# Patient Record
Sex: FEMALE | Race: BLACK OR AFRICAN AMERICAN | NOT HISPANIC OR LATINO | Employment: FULL TIME | URBAN - METROPOLITAN AREA
[De-identification: names, ages, dates, MRNs, and addresses within clinical notes are randomized per-mention and may not be internally consistent; named-entity substitution may affect disease eponyms.]

---

## 2023-01-20 PROBLEM — R21 RASH: Status: ACTIVE | Noted: 2023-01-20

## 2023-01-20 PROBLEM — E83.52 HYPERCALCEMIA: Status: ACTIVE | Noted: 2023-01-20

## 2023-01-20 PROBLEM — E55.9 VITAMIN D DEFICIENCY: Status: ACTIVE | Noted: 2023-01-20

## 2023-01-20 PROBLEM — E03.9 HYPOTHYROIDISM: Status: ACTIVE | Noted: 2023-01-20

## 2023-01-20 PROBLEM — M17.9 KNEE OSTEOARTHRITIS: Status: ACTIVE | Noted: 2023-01-20

## 2023-01-20 PROBLEM — M32.9 SYSTEMIC LUPUS ERYTHEMATOSUS (MULTI): Status: ACTIVE | Noted: 2023-01-20

## 2023-01-20 PROBLEM — I50.20 SYSTOLIC CONGESTIVE HEART FAILURE (MULTI): Status: ACTIVE | Noted: 2023-01-20

## 2023-01-20 PROBLEM — R73.03 PRE-DIABETES: Status: ACTIVE | Noted: 2023-01-20

## 2023-01-20 PROBLEM — D50.9 IRON DEFICIENCY ANEMIA: Status: ACTIVE | Noted: 2023-01-20

## 2023-01-20 PROBLEM — I10 HYPERTENSION: Status: ACTIVE | Noted: 2023-01-20

## 2023-01-20 PROBLEM — L21.9 SEBORRHEIC DERMATITIS: Status: ACTIVE | Noted: 2023-01-20

## 2023-01-20 PROBLEM — I25.10 CORONARY ARTERY DISEASE: Status: ACTIVE | Noted: 2023-01-20

## 2023-01-20 PROBLEM — E21.0 HYPERPARATHYROIDISM, PRIMARY (MULTI): Status: ACTIVE | Noted: 2023-01-20

## 2023-01-20 RX ORDER — AMLODIPINE BESYLATE 5 MG/1
1 TABLET ORAL DAILY
COMMUNITY
Start: 2016-10-12 | End: 2023-03-07 | Stop reason: SDUPTHER

## 2023-01-20 RX ORDER — LEVOTHYROXINE SODIUM 125 UG/1
TABLET ORAL DAILY
COMMUNITY
Start: 2018-12-26 | End: 2023-03-23 | Stop reason: SDUPTHER

## 2023-01-20 RX ORDER — NYSTATIN AND TRIAMCINOLONE ACETONIDE 100000; 1 [USP'U]/G; MG/G
CREAM TOPICAL
COMMUNITY
Start: 2020-11-06

## 2023-01-20 RX ORDER — METFORMIN HYDROCHLORIDE 500 MG/1
TABLET ORAL
COMMUNITY
Start: 2016-10-04

## 2023-01-20 RX ORDER — ERGOCALCIFEROL 1.25 MG/1
1 CAPSULE ORAL
COMMUNITY
Start: 2017-05-24

## 2023-01-20 RX ORDER — CHLORTHALIDONE 25 MG/1
25 TABLET ORAL DAILY
COMMUNITY
End: 2023-03-07 | Stop reason: SDUPTHER

## 2023-03-07 ENCOUNTER — OFFICE VISIT (OUTPATIENT)
Dept: PRIMARY CARE | Facility: CLINIC | Age: 63
End: 2023-03-07
Payer: COMMERCIAL

## 2023-03-07 VITALS
OXYGEN SATURATION: 97 % | HEART RATE: 77 BPM | TEMPERATURE: 97 F | BODY MASS INDEX: 37.47 KG/M2 | DIASTOLIC BLOOD PRESSURE: 73 MMHG | SYSTOLIC BLOOD PRESSURE: 130 MMHG | HEIGHT: 69 IN | WEIGHT: 253 LBS

## 2023-03-07 DIAGNOSIS — I10 PRIMARY HYPERTENSION: Primary | ICD-10-CM

## 2023-03-07 PROCEDURE — 3078F DIAST BP <80 MM HG: CPT | Performed by: STUDENT IN AN ORGANIZED HEALTH CARE EDUCATION/TRAINING PROGRAM

## 2023-03-07 PROCEDURE — 1036F TOBACCO NON-USER: CPT | Performed by: STUDENT IN AN ORGANIZED HEALTH CARE EDUCATION/TRAINING PROGRAM

## 2023-03-07 PROCEDURE — 3075F SYST BP GE 130 - 139MM HG: CPT | Performed by: STUDENT IN AN ORGANIZED HEALTH CARE EDUCATION/TRAINING PROGRAM

## 2023-03-07 PROCEDURE — 99213 OFFICE O/P EST LOW 20 MIN: CPT | Performed by: STUDENT IN AN ORGANIZED HEALTH CARE EDUCATION/TRAINING PROGRAM

## 2023-03-07 RX ORDER — AMLODIPINE BESYLATE 5 MG/1
5 TABLET ORAL DAILY
Qty: 90 TABLET | Refills: 3 | Status: SHIPPED | OUTPATIENT
Start: 2023-03-07 | End: 2023-03-23 | Stop reason: SDUPTHER

## 2023-03-07 RX ORDER — CHLORTHALIDONE 25 MG/1
25 TABLET ORAL DAILY
Qty: 90 TABLET | Refills: 3 | Status: SHIPPED | OUTPATIENT
Start: 2023-03-07 | End: 2023-03-23 | Stop reason: SDUPTHER

## 2023-03-07 ASSESSMENT — PAIN SCALES - GENERAL: PAINLEVEL: 0-NO PAIN

## 2023-03-07 NOTE — PROGRESS NOTES
"03/08/23    Subjective   Patient ID: Damari Conteh is a 62 y.o. female who presents for No chief complaint on file..    HPI    HTN  - taking amlodipine, chlorthalidone  - not missing doses  - taking BP infrequently at home, usually 130s  - denies CP, SOB, LE edema      Objective   /73   Pulse 77   Temp 36.1 °C (97 °F) (Temporal)   Ht 1.753 m (5' 9\")   Wt 115 kg (253 lb)   SpO2 97%   BMI 37.36 kg/m²     Physical Exam    General: well-appearing, NAD  HEENT: NC/AT  Cardiac: rrr, no m/r/g  Lungs: normal work of breathing, CTAB  Neuro: grossly intact  Psych: no depression, anxiety      Assessment/Plan   Problem List Items Addressed This Visit          Circulatory    Hypertension - Primary    Relevant Medications    amLODIPine (Norvasc) 5 mg tablet    chlorthalidone (Hygroton) 25 mg tablet     HTN  - chronic, stable  - continue medications, sent refills to pharmacy  - RTC in about 6 months for physical    Trung Xiong MD  "

## 2023-03-23 DIAGNOSIS — E03.9 HYPOTHYROIDISM, UNSPECIFIED TYPE: Primary | ICD-10-CM

## 2023-03-23 DIAGNOSIS — I10 PRIMARY HYPERTENSION: ICD-10-CM

## 2023-03-23 RX ORDER — AMLODIPINE BESYLATE 5 MG/1
5 TABLET ORAL DAILY
Qty: 90 TABLET | Refills: 3 | Status: SHIPPED | OUTPATIENT
Start: 2023-03-23 | End: 2023-03-23

## 2023-03-23 RX ORDER — CHLORTHALIDONE 25 MG/1
25 TABLET ORAL DAILY
Qty: 90 TABLET | Refills: 3 | Status: SHIPPED | OUTPATIENT
Start: 2023-03-23 | End: 2023-03-23

## 2023-03-23 RX ORDER — LEVOTHYROXINE SODIUM 125 UG/1
TABLET ORAL
Status: CANCELLED | OUTPATIENT
Start: 2023-03-23

## 2023-03-23 RX ORDER — LEVOTHYROXINE SODIUM 125 UG/1
125 TABLET ORAL
Qty: 90 TABLET | Refills: 3 | Status: SHIPPED | OUTPATIENT
Start: 2023-03-23 | End: 2023-03-23

## 2023-03-23 RX ORDER — CHLORTHALIDONE 25 MG/1
25 TABLET ORAL DAILY
Qty: 90 TABLET | Refills: 3 | Status: CANCELLED | OUTPATIENT
Start: 2023-03-23

## 2023-03-23 RX ORDER — AMLODIPINE BESYLATE 5 MG/1
5 TABLET ORAL DAILY
Qty: 90 TABLET | Refills: 3 | Status: CANCELLED | OUTPATIENT
Start: 2023-03-23

## 2023-03-23 NOTE — TELEPHONE ENCOUNTER
Patient called the refill line asking for refills on pended medications. She was last seen in clinic 03/07/23

## 2023-10-09 ENCOUNTER — PHARMACY VISIT (OUTPATIENT)
Dept: PHARMACY | Facility: CLINIC | Age: 63
End: 2023-10-09
Payer: COMMERCIAL

## 2023-10-09 PROCEDURE — RXMED WILLOW AMBULATORY MEDICATION CHARGE

## 2023-11-28 ENCOUNTER — OFFICE VISIT (OUTPATIENT)
Dept: PRIMARY CARE | Facility: CLINIC | Age: 63
End: 2023-11-28
Payer: COMMERCIAL

## 2023-11-28 ENCOUNTER — PHARMACY VISIT (OUTPATIENT)
Dept: PHARMACY | Facility: CLINIC | Age: 63
End: 2023-11-28
Payer: COMMERCIAL

## 2023-11-28 VITALS
DIASTOLIC BLOOD PRESSURE: 80 MMHG | OXYGEN SATURATION: 95 % | HEIGHT: 70 IN | HEART RATE: 75 BPM | SYSTOLIC BLOOD PRESSURE: 140 MMHG | TEMPERATURE: 97 F | WEIGHT: 262 LBS | BODY MASS INDEX: 37.51 KG/M2

## 2023-11-28 DIAGNOSIS — E03.9 HYPOTHYROIDISM, UNSPECIFIED TYPE: ICD-10-CM

## 2023-11-28 DIAGNOSIS — I10 PRIMARY HYPERTENSION: ICD-10-CM

## 2023-11-28 LAB
CREAT UR-MCNC: 161.7 MG/DL (ref 20–320)
MICROALBUMIN UR-MCNC: 8.4 MG/L
MICROALBUMIN/CREAT UR: 5.2 UG/MG CREAT

## 2023-11-28 PROCEDURE — 82043 UR ALBUMIN QUANTITATIVE: CPT

## 2023-11-28 PROCEDURE — 3077F SYST BP >= 140 MM HG: CPT | Performed by: STUDENT IN AN ORGANIZED HEALTH CARE EDUCATION/TRAINING PROGRAM

## 2023-11-28 PROCEDURE — 82570 ASSAY OF URINE CREATININE: CPT

## 2023-11-28 PROCEDURE — 3079F DIAST BP 80-89 MM HG: CPT | Performed by: STUDENT IN AN ORGANIZED HEALTH CARE EDUCATION/TRAINING PROGRAM

## 2023-11-28 PROCEDURE — 1036F TOBACCO NON-USER: CPT | Performed by: STUDENT IN AN ORGANIZED HEALTH CARE EDUCATION/TRAINING PROGRAM

## 2023-11-28 PROCEDURE — 99213 OFFICE O/P EST LOW 20 MIN: CPT | Performed by: STUDENT IN AN ORGANIZED HEALTH CARE EDUCATION/TRAINING PROGRAM

## 2023-11-28 RX ORDER — LEVOTHYROXINE SODIUM 125 UG/1
125 TABLET ORAL
Qty: 90 TABLET | Refills: 3 | Status: SHIPPED | OUTPATIENT
Start: 2023-11-28 | End: 2024-11-27

## 2023-11-28 RX ORDER — CHLORTHALIDONE 25 MG/1
25 TABLET ORAL
Qty: 90 TABLET | Refills: 3 | Status: SHIPPED | OUTPATIENT
Start: 2023-11-28 | End: 2024-11-27

## 2023-11-28 RX ORDER — AMLODIPINE BESYLATE 10 MG/1
10 TABLET ORAL DAILY
Qty: 90 TABLET | Refills: 3 | Status: SHIPPED | OUTPATIENT
Start: 2023-11-28 | End: 2024-11-27

## 2023-11-28 ASSESSMENT — PAIN SCALES - GENERAL: PAINLEVEL: 0-NO PAIN

## 2023-11-28 NOTE — PROGRESS NOTES
"Subjective   Patient ID: Damari Conteh is a 63 y.o. female who is presenting for a follow-up visit for management of hypertension.    HPI:    #HTN  - Previously seen 3/2023 by Dr. Xiong  - Currently taking amlodipine 5 mg, chlorthalidone 25 mg, not missing doses  - 2 week history of morning tension-type headaches, no interruptions to sleep  - Last checked BP at home 1 month ago - states home readings approx like the one today at 140s/80s  - Denies LE edema, SOB, blurry vision, CP, palpitations    Review of Systems  Constitutional:  Negative for chills and fever.   HENT:  Negative for congestion.    Eyes:  Negative for visual disturbance.   Respiratory:  Negative for cough and shortness of breath.    Cardiovascular:  Negative for chest pain.   Gastrointestinal:  Negative for abdominal pain, diarrhea and nausea.   Genitourinary: Negative.    Musculoskeletal:  Positive for chronic bilateral knee pain. Also endorses recent history of neck pain reproducible on left neck rotation.  Neurological:  Negative for dizziness and headaches.   Psychiatric/Behavioral: No changes in mood or behavior     Objective   /80 (BP Location: Right arm, BP Cuff Size: Adult long)   Pulse 75   Temp 36.1 °C (97 °F) (Temporal)   Ht 1.778 m (5' 10\")   Wt 119 kg (262 lb)   SpO2 95%   BMI 37.59 kg/m²     Physical Exam  Constitutional:       General: She is not in acute distress.  Eyes:      Conjunctiva/sclera: Conjunctivae normal.   Cardiovascular:      Rate and Rhythm: Normal rate and regular rhythm.      Heart sounds: Normal heart sounds. No murmur heard.  Pulmonary:      Effort: Pulmonary effort is normal.      Breath sounds: Normal breath sounds.   Abdominal:      General: There is no distension.      Palpations: Abdomen is soft.   Musculoskeletal:         General: Normal range of motion.      Cervical back: Mild pain of leftward neck rotation. No limited neck ROM  Neurological:      General: No focal deficit present.      Mental " Status: She is alert and oriented to person, place, and time.      Comments: CN 2-12 grossly intact   Psychiatric:         Mood and Affect: Mood normal.         Behavior: Behavior normal.     Assessment/Plan     #Primary hypertension  - Chronic, not at goal   - Patient's blood pressure remains elevated despite adherence to medication. States that  systolic measured in office today is comparable to what she has seen at home in the past. Not regularly checking blood pressure. In setting of persistently elevated BP with new-onset headaches, will increase amlodipine dosage. Additionally, patient's last RFP 1/2023 demonstrated decreased GFR (56). Plan to repeat labs with urinalysis to assess for albuminuria.  - START amlodipine 10 mg daily  - CONTINUE chlorthalidone 25 mg  - Ordered RFP, UA albumin  - Continue monitoring home BP    Jose Rashid, MS3  Medical Student    I saw and evaluated the patient. I personally obtained the key and critical portions of the history and physical exam. I reviewed and modified the student's documentation and discussed the patient with the medical student. I agree with the above documentation and medical decision making.    Follow up/Return to the clinic in 1 month for HTN    Attending Supervision: discussed with attending physician, Dr. Gustavo Sheridan MD  Family Medicine, PGY-3

## 2023-11-28 NOTE — PROGRESS NOTES
"Subjective   Patient ID: Damari Conteh is a 63 y.o. female who presents for Follow-up.    HPI    Review of Systems    Objective   /80 (BP Location: Right arm, BP Cuff Size: Adult long)   Pulse 75   Temp 36.1 °C (97 °F) (Temporal)   Ht 1.778 m (5' 10\")   Wt 119 kg (262 lb)   SpO2 95%   BMI 37.59 kg/m²      Physical Exam     Assessment/Plan   Diagnoses and all orders for this visit:  Primary hypertension  -     amLODIPine (Norvasc) 10 mg tablet; Take 1 tablet (10 mg) by mouth once daily.  -     chlorthalidone (Hygroton) 25 mg tablet; TAKE 1 TABLET (25 MG) BY MOUTH ONCE DAILY  -     Renal function panel; Future  -     Albumin, urine, random; Future  Hypothyroidism, unspecified type  -     levothyroxine (Synthroid, Levoxyl) 125 mcg tablet; TAKE 1 TABLET (125 MCG) BY MOUTH ONCE DAILY IN THE MORNING. TAKE BEFORE MEALS.  Other orders  -     Follow Up In Primary Care      Follow up/Return to the clinic in {RTC:06556}    {Attending Supervision:87599::\"discussed\":1} with attending physician,  ***    Kasey Sheridan MD  Family Medicine, PGY-3    "

## 2023-12-01 NOTE — PROGRESS NOTES
I reviewed the resident/fellow's documentation and discussed the patient with the resident/fellow. I agree with the resident/fellow's medical decision making as documented in the note.     Herbert Chen MD

## 2024-01-12 ENCOUNTER — APPOINTMENT (OUTPATIENT)
Dept: PRIMARY CARE | Facility: CLINIC | Age: 64
End: 2024-01-12
Payer: COMMERCIAL

## 2024-01-15 PROCEDURE — RXMED WILLOW AMBULATORY MEDICATION CHARGE

## 2024-01-16 ENCOUNTER — PHARMACY VISIT (OUTPATIENT)
Dept: PHARMACY | Facility: CLINIC | Age: 64
End: 2024-01-16
Payer: COMMERCIAL

## 2024-04-22 PROCEDURE — RXMED WILLOW AMBULATORY MEDICATION CHARGE

## 2024-04-23 ENCOUNTER — PHARMACY VISIT (OUTPATIENT)
Dept: PHARMACY | Facility: CLINIC | Age: 64
End: 2024-04-23
Payer: COMMERCIAL

## 2024-05-28 ENCOUNTER — APPOINTMENT (OUTPATIENT)
Dept: ORTHOPEDIC SURGERY | Facility: CLINIC | Age: 64
End: 2024-05-28
Payer: COMMERCIAL

## 2024-05-28 ENCOUNTER — APPOINTMENT (OUTPATIENT)
Dept: ORTHOPEDIC SURGERY | Facility: HOSPITAL | Age: 64
End: 2024-05-28
Payer: COMMERCIAL

## 2024-05-28 DIAGNOSIS — M25.562 ACUTE BILATERAL KNEE PAIN: Primary | ICD-10-CM

## 2024-05-28 DIAGNOSIS — M25.561 ACUTE BILATERAL KNEE PAIN: Primary | ICD-10-CM

## 2024-06-18 ENCOUNTER — HOSPITAL ENCOUNTER (OUTPATIENT)
Dept: RADIOLOGY | Facility: EXTERNAL LOCATION | Age: 64
Discharge: HOME | End: 2024-06-18

## 2024-06-18 ENCOUNTER — OFFICE VISIT (OUTPATIENT)
Dept: ORTHOPEDIC SURGERY | Facility: HOSPITAL | Age: 64
End: 2024-06-18
Payer: COMMERCIAL

## 2024-06-18 ENCOUNTER — HOSPITAL ENCOUNTER (OUTPATIENT)
Dept: RADIOLOGY | Facility: HOSPITAL | Age: 64
Discharge: HOME | End: 2024-06-18
Payer: COMMERCIAL

## 2024-06-18 DIAGNOSIS — M17.0 PRIMARY OSTEOARTHRITIS OF BOTH KNEES: Primary | ICD-10-CM

## 2024-06-18 DIAGNOSIS — M25.561 PAIN IN BOTH KNEES, UNSPECIFIED CHRONICITY: ICD-10-CM

## 2024-06-18 DIAGNOSIS — M25.562 PAIN IN BOTH KNEES, UNSPECIFIED CHRONICITY: ICD-10-CM

## 2024-06-18 PROCEDURE — 20611 DRAIN/INJ JOINT/BURSA W/US: CPT | Mod: 50 | Performed by: FAMILY MEDICINE

## 2024-06-18 PROCEDURE — 2500000004 HC RX 250 GENERAL PHARMACY W/ HCPCS (ALT 636 FOR OP/ED): Performed by: FAMILY MEDICINE

## 2024-06-18 PROCEDURE — 2500000005 HC RX 250 GENERAL PHARMACY W/O HCPCS: Performed by: FAMILY MEDICINE

## 2024-06-18 PROCEDURE — 73562 X-RAY EXAM OF KNEE 3: CPT | Mod: LT

## 2024-06-18 PROCEDURE — 99204 OFFICE O/P NEW MOD 45 MIN: CPT | Performed by: FAMILY MEDICINE

## 2024-06-18 PROCEDURE — 73560 X-RAY EXAM OF KNEE 1 OR 2: CPT | Mod: RT

## 2024-06-18 PROCEDURE — 99214 OFFICE O/P EST MOD 30 MIN: CPT | Performed by: FAMILY MEDICINE

## 2024-06-18 RX ORDER — LIDOCAINE HYDROCHLORIDE 10 MG/ML
4 INJECTION INFILTRATION; PERINEURAL
Status: COMPLETED | OUTPATIENT
Start: 2024-06-18 | End: 2024-06-18

## 2024-06-18 RX ORDER — METHYLPREDNISOLONE ACETATE 40 MG/ML
80 INJECTION, SUSPENSION INTRA-ARTICULAR; INTRALESIONAL; INTRAMUSCULAR; SOFT TISSUE
Status: COMPLETED | OUTPATIENT
Start: 2024-06-18 | End: 2024-06-18

## 2024-06-18 RX ORDER — ROPIVACAINE HYDROCHLORIDE 5 MG/ML
4 INJECTION, SOLUTION EPIDURAL; INFILTRATION; PERINEURAL
Status: COMPLETED | OUTPATIENT
Start: 2024-06-18 | End: 2024-06-18

## 2024-06-18 NOTE — PROGRESS NOTES
Chief complaint: Bilateral knee pain    Sports Medicine Office Note  Patient ID: Damari Conteh is a 63 y.o. female.    L Inj/Asp: bilateral knee on 6/18/2024 2:07 PM  Indications: pain  Details: 21 G needle, ultrasound-guided superolateral approach  Medications (Right): 4 mL lidocaine 10 mg/mL (1 %); 80 mg methylPREDNISolone acetate 40 mg/mL; 4 mL ropivacaine 5 mg/mL (0.5 %)  Medications (Left): 4 mL lidocaine 10 mg/mL (1 %); 80 mg methylPREDNISolone acetate 40 mg/mL; 4 mL ropivacaine 5 mg/mL (0.5 %)  Outcome: tolerated well, no immediate complications  Procedure, treatment alternatives, risks and benefits explained, specific risks discussed. Consent was given by the patient. Immediately prior to procedure a time out was called to verify the correct patient, procedure, equipment, support staff and site/side marked as required. Patient was prepped and draped in the usual sterile fashion.         Today's Date:  06/18/2024     HPI: Damari Conteh is a 63 y.o. female  at [employer] [facility] [place] who presents today for bilateral knee pains.    She localizes the bilateral knee pain to the anterior joint line bilaterally and the lateral joint line on the left.  Pain started approximately 5 years ago on the right knee.  At that time she was seen by Dr. Smiley and had an aspiration and steroid injection which gave her significant relief.  With the recurrence of pain she has been trying Voltaren gel once daily and Tylenol up to 3 times daily with minimal improvement in her pain.  The pain is worse at work and with prolonged periods of ambulation and stairs.  She denies radiation of pain, skin tingling, weakness.    She has no other complaints.    Physical Examination:   The bilateral knee are without obvious signs of acute bony deformity, swelling, erythema, ecchymosis or joint effusion. The patella is without tenderness. Apprehension is negative with medial and lateral glide. Patella crepitus is positive. Patella grind is positive. The  medial joint line is nontender and without bony crepitus or step-off. The lateral joint line is tender on the left and without bony crepitus or step-off. Flexion & extension are reduced to 110 on the left and 120 on the right. Varus & valgus stress test at 0° and 30° of flexion, Lachman's, Alejandra's are all negative. Gait is mildly antalgic and tandem.    Imaging:  Radiographs of the bilateral knees showed moderate tricompartmental osteoarthritis with significant bone spurring on the lateral compartments bilaterally.    The studies were reviewed by me personally in the office today.      Procedure #1:  After consent was obtained, left knee was prepped in a sterile fashion. Ultrasound guidance was used to help insure proper needle placement into the left knee, decrease patient discomfort, and decrease collateral damage. The joint was visualized and aspirated.  90 mL of straw-colored fluid was aspirated from the joint and Depo-Medrol 80 mg with lidocaine 4 mL & ropivacaine 4 mL were injected without any complications. Ultrasound images were saved on an internal file for later reference. The patient tolerated the procedure well and the area was cleaned and bandaged.    Procedure #2:  After consent was obtained, right knee was prepped in a sterile fashion. Ultrasound guidance was used to help insure proper needle placement into the right knee, decrease patient discomfort, and decrease collateral damage. The joint was aspirated.  30 mL of straw-colored fluid was aspirated from the joint and Depo-Medrol 80 mg with lidocaine 4 mL & ropivacaine 4 mL were injected without any complications. Ultrasound images were saved on an internal file for later reference. The patient tolerated the procedure well and the area was cleaned and bandaged.    Procedure Note Attestation   Procedure performed by my sports medicine fellow.    I, Dr. Alexander Bah MD, supervised the entire procedure .    Problem List Items Addressed This Visit              ICD-10-CM    Knee osteoarthritis - Primary M17.9    Relevant Orders    Point of Care Ultrasound (Completed)     Other Visit Diagnoses         Codes    Pain in both knees, unspecified chronicity     M25.561, M25.562    Relevant Orders    XR knee left 3 views    XR knee right 1-2 views            Assessment and Plan:  We reviewed the exam and x-ray findings and discussed the conservative and surgical treatment options. We agreed to do aspiration and corticosteroid injections in the bilateral knees.  Patient tolerated procedure well.  Patient was provided with postinjection instructions in the office today.  She understands that she can have these performed as soon as 3 months.  All questions were answered she was agreeable to plan.  Will have her follow-up as needed.    Madi Quiroz DO  Primary Care Sports Medicine Fellow    **This note was dictated using Dragon speech recognition software and was not corrected for spelling or grammatical errors**.

## 2024-07-30 ENCOUNTER — PHARMACY VISIT (OUTPATIENT)
Dept: PHARMACY | Facility: CLINIC | Age: 64
End: 2024-07-30
Payer: COMMERCIAL

## 2024-07-30 PROCEDURE — RXMED WILLOW AMBULATORY MEDICATION CHARGE

## 2024-10-30 ENCOUNTER — PHARMACY VISIT (OUTPATIENT)
Dept: PHARMACY | Facility: CLINIC | Age: 64
End: 2024-10-30
Payer: COMMERCIAL

## 2024-10-30 PROCEDURE — RXMED WILLOW AMBULATORY MEDICATION CHARGE

## 2025-01-13 ENCOUNTER — OFFICE VISIT (OUTPATIENT)
Facility: HOSPITAL | Age: 65
End: 2025-01-13
Payer: COMMERCIAL

## 2025-01-13 VITALS
HEIGHT: 67 IN | BODY MASS INDEX: 40.15 KG/M2 | OXYGEN SATURATION: 97 % | WEIGHT: 255.8 LBS | DIASTOLIC BLOOD PRESSURE: 73 MMHG | HEART RATE: 65 BPM | SYSTOLIC BLOOD PRESSURE: 119 MMHG | TEMPERATURE: 96.2 F

## 2025-01-13 DIAGNOSIS — Z12.39 BREAST SCREENING: ICD-10-CM

## 2025-01-13 DIAGNOSIS — I10 ESSENTIAL HYPERTENSION: Primary | ICD-10-CM

## 2025-01-13 DIAGNOSIS — I10 PRIMARY HYPERTENSION: ICD-10-CM

## 2025-01-13 PROCEDURE — 99213 OFFICE O/P EST LOW 20 MIN: CPT | Performed by: FAMILY MEDICINE

## 2025-01-13 PROCEDURE — 3074F SYST BP LT 130 MM HG: CPT | Performed by: FAMILY MEDICINE

## 2025-01-13 PROCEDURE — RXMED WILLOW AMBULATORY MEDICATION CHARGE

## 2025-01-13 PROCEDURE — 1036F TOBACCO NON-USER: CPT | Performed by: FAMILY MEDICINE

## 2025-01-13 PROCEDURE — 3078F DIAST BP <80 MM HG: CPT | Performed by: FAMILY MEDICINE

## 2025-01-13 PROCEDURE — 3008F BODY MASS INDEX DOCD: CPT | Performed by: FAMILY MEDICINE

## 2025-01-13 RX ORDER — AMLODIPINE BESYLATE 10 MG/1
10 TABLET ORAL DAILY
Qty: 90 TABLET | Refills: 3 | Status: SHIPPED | OUTPATIENT
Start: 2025-01-13 | End: 2026-01-13

## 2025-01-13 RX ORDER — CHLORTHALIDONE 25 MG/1
25 TABLET ORAL
Qty: 90 TABLET | Refills: 3 | Status: SHIPPED | OUTPATIENT
Start: 2025-01-13 | End: 2026-01-13

## 2025-01-13 ASSESSMENT — PAIN SCALES - GENERAL: PAINLEVEL_OUTOF10: 3

## 2025-01-13 ASSESSMENT — ENCOUNTER SYMPTOMS
CONSTITUTIONAL NEGATIVE: 1
DEPRESSION: 0

## 2025-01-13 ASSESSMENT — PATIENT HEALTH QUESTIONNAIRE - PHQ9
2. FEELING DOWN, DEPRESSED OR HOPELESS: NOT AT ALL
1. LITTLE INTEREST OR PLEASURE IN DOING THINGS: NOT AT ALL
SUM OF ALL RESPONSES TO PHQ9 QUESTIONS 1 AND 2: 0

## 2025-01-13 NOTE — PROGRESS NOTES
"Subjective   Patient ID: Damari Conteh is a 64 y.o. female who presents for Follow-up, Referral, and Med Refill.    Here for followup of hypertension. Regimen changed in November 2023 (14 months ago) to amlodipine and chlorthalidone. Reports good adherence and pressure is normal today. Also requests screening mammogram.    Feels perfectly well.     Med Refill         Review of Systems   Constitutional: Negative.        Objective   /73 (BP Location: Right arm, Patient Position: Sitting)   Pulse 65   Temp 35.7 °C (96.2 °F) (Temporal)   Ht 1.694 m (5' 6.69\")   Wt 116 kg (255 lb 12.8 oz)   SpO2 97%   BMI 40.43 kg/m²     Physical Exam  Constitutional:       Appearance: Normal appearance. She is obese. She is not ill-appearing.   Cardiovascular:      Rate and Rhythm: Normal rate and regular rhythm.      Heart sounds: No murmur heard.  Pulmonary:      Effort: Pulmonary effort is normal.      Breath sounds: Normal breath sounds.   Psychiatric:         Mood and Affect: Mood normal.         Behavior: Behavior normal.         Thought Content: Thought content normal.         Assessment/Plan :  Pleasant 64 year old woman with hypertension. Refilled medication. Screening mammogram ordered.          "

## 2025-01-15 ENCOUNTER — PHARMACY VISIT (OUTPATIENT)
Dept: PHARMACY | Facility: CLINIC | Age: 65
End: 2025-01-15
Payer: COMMERCIAL

## 2025-01-21 DIAGNOSIS — E03.9 HYPOTHYROIDISM, UNSPECIFIED TYPE: ICD-10-CM

## 2025-01-21 PROCEDURE — RXMED WILLOW AMBULATORY MEDICATION CHARGE

## 2025-01-21 RX ORDER — LEVOTHYROXINE SODIUM 125 UG/1
125 TABLET ORAL
Qty: 30 TABLET | Refills: 4 | Status: SHIPPED | OUTPATIENT
Start: 2025-01-21 | End: 2026-01-21

## 2025-01-24 ENCOUNTER — PHARMACY VISIT (OUTPATIENT)
Dept: PHARMACY | Facility: CLINIC | Age: 65
End: 2025-01-24
Payer: COMMERCIAL

## 2025-01-27 ENCOUNTER — HOSPITAL ENCOUNTER (OUTPATIENT)
Dept: RADIOLOGY | Facility: HOSPITAL | Age: 65
Discharge: HOME | End: 2025-01-27
Payer: COMMERCIAL

## 2025-01-27 DIAGNOSIS — Z12.39 BREAST SCREENING: ICD-10-CM

## 2025-01-27 PROCEDURE — 77063 BREAST TOMOSYNTHESIS BI: CPT | Performed by: RADIOLOGY

## 2025-01-27 PROCEDURE — 77067 SCR MAMMO BI INCL CAD: CPT | Performed by: RADIOLOGY

## 2025-01-27 PROCEDURE — 77067 SCR MAMMO BI INCL CAD: CPT

## 2025-03-12 DIAGNOSIS — E03.9 HYPOTHYROIDISM, UNSPECIFIED TYPE: ICD-10-CM

## 2025-03-12 PROCEDURE — RXMED WILLOW AMBULATORY MEDICATION CHARGE

## 2025-03-12 RX ORDER — LEVOTHYROXINE SODIUM 125 UG/1
125 TABLET ORAL
Qty: 30 TABLET | Refills: 4 | Status: SHIPPED | OUTPATIENT
Start: 2025-03-12 | End: 2026-03-12

## 2025-03-14 ENCOUNTER — PHARMACY VISIT (OUTPATIENT)
Dept: PHARMACY | Facility: CLINIC | Age: 65
End: 2025-03-14
Payer: COMMERCIAL

## 2025-04-17 ENCOUNTER — PHARMACY VISIT (OUTPATIENT)
Dept: PHARMACY | Facility: CLINIC | Age: 65
End: 2025-04-17
Payer: COMMERCIAL

## 2025-04-17 PROCEDURE — RXMED WILLOW AMBULATORY MEDICATION CHARGE

## 2025-05-15 PROCEDURE — RXMED WILLOW AMBULATORY MEDICATION CHARGE

## 2025-05-16 ENCOUNTER — PHARMACY VISIT (OUTPATIENT)
Dept: PHARMACY | Facility: CLINIC | Age: 65
End: 2025-05-16
Payer: COMMERCIAL

## 2025-05-30 ENCOUNTER — APPOINTMENT (OUTPATIENT)
Dept: ENDOCRINOLOGY | Facility: CLINIC | Age: 65
End: 2025-05-30
Payer: COMMERCIAL

## 2025-05-30 VITALS
BODY MASS INDEX: 37.33 KG/M2 | HEIGHT: 69 IN | SYSTOLIC BLOOD PRESSURE: 120 MMHG | WEIGHT: 252 LBS | HEART RATE: 73 BPM | DIASTOLIC BLOOD PRESSURE: 68 MMHG

## 2025-05-30 DIAGNOSIS — E21.3 HYPERPARATHYROIDISM (MULTI): Primary | ICD-10-CM

## 2025-05-30 DIAGNOSIS — E03.8 OTHER SPECIFIED HYPOTHYROIDISM: ICD-10-CM

## 2025-05-30 DIAGNOSIS — R73.03 PRE-DIABETES: ICD-10-CM

## 2025-05-30 DIAGNOSIS — E03.9 HYPOTHYROIDISM, UNSPECIFIED TYPE: ICD-10-CM

## 2025-05-30 PROCEDURE — 99214 OFFICE O/P EST MOD 30 MIN: CPT | Performed by: INTERNAL MEDICINE

## 2025-05-30 PROCEDURE — 3074F SYST BP LT 130 MM HG: CPT | Performed by: INTERNAL MEDICINE

## 2025-05-30 PROCEDURE — 3078F DIAST BP <80 MM HG: CPT | Performed by: INTERNAL MEDICINE

## 2025-05-30 PROCEDURE — 3008F BODY MASS INDEX DOCD: CPT | Performed by: INTERNAL MEDICINE

## 2025-05-30 PROCEDURE — 1036F TOBACCO NON-USER: CPT | Performed by: INTERNAL MEDICINE

## 2025-05-30 RX ORDER — HYDROCORTISONE 25 MG/G
1 CREAM TOPICAL AS NEEDED
COMMUNITY
Start: 2018-12-21

## 2025-05-30 RX ORDER — LEVOTHYROXINE SODIUM 125 UG/1
125 TABLET ORAL
Qty: 90 TABLET | Refills: 3 | Status: SHIPPED | OUTPATIENT
Start: 2025-05-30 | End: 2026-05-30

## 2025-05-30 ASSESSMENT — PATIENT HEALTH QUESTIONNAIRE - PHQ9
2. FEELING DOWN, DEPRESSED OR HOPELESS: NOT AT ALL
SUM OF ALL RESPONSES TO PHQ9 QUESTIONS 1 AND 2: 0
1. LITTLE INTEREST OR PLEASURE IN DOING THINGS: NOT AT ALL

## 2025-05-30 ASSESSMENT — PAIN SCALES - GENERAL: PAINLEVEL_OUTOF10: 0-NO PAIN

## 2025-05-30 NOTE — PROGRESS NOTES
Chief Complaint     F/U for hypothyroidism/ hyperparathyroidism      History of Present Illness 63 YO F w/ PMHx of primary hypothyroidism, HTN, prediabetes, primary hyperparathyroidism, NICM who presents for f/u.     Relevant Hx:  Hyperparathyroidism  No history of fractures, no family history of fractures, no history of kidney stones, no history of chronic glucocorticoid use. She had a DXA scan done on 7/2018 which did not show any osteopenia or osteoporosis. She had 24 hr urine calcium and 24 hr urine phosphorus done in 12/2017 which showed Urine calcium of 194 and urine phosphorus of 777. Her corrected calcium in the past never was never above 10.7, Her last PTH on 12/2017 was 114 with a Vit D level of 54. Patient denies abdominal pain, GERD, episodes of confusion, polyuria or polydipsia. She drinks 1.5 L of water every day.     Last visit 1/23 Instructed to take metformin with food. Biochemically euthyroid.     Interval Hx  Adherent w/ LT4- takes properly.  Has not taken Vit D or metformin for over a year.  Dx w/ HTN and started on chlorthalidone and amlopdipine 11/4/22. 12/12/22 .6 Vit D 37 but no corresponding RFP.     Reports BL knee pain/OA  No fatigue, stable energy  No sleep disturbance- in spite of nocturia 2X nightly   Stable appetite and weight.  No ocular sx- tearing, gritty sensation, itching  No hoarseness, trouble swallowing, or compressive sx   Denies any chest pain, shortness of breath, palpitations.  No changes in bowel habits  Denies polydipsia polyuria  No tremors,prox muscle weakness, cramps, tingling.  Reports hot flashes,no night sweats  No heat/cold intol  No nausea/vomit or abdominal pain  If F, no spotting  No Biotin    Exam  Constitutional: NAD, well groomed. AOx3. Cooperative. Obese  Skin/Hair: Warm, dry skin, acanthosis nigricans on the neck with multiple skin tags  HEENT: EOMI, Anicteric scleras, No lid lag or lid retraction, No TTP of firm ocular globes. Dry oral mucosa.  Chvostek sign negative  Neck: Soft, supple. Non tender, full ROM, no lymphadenopathy. Thyroid gland palpation: non nodular, soft consistency, non tender, no bruit.   Cardiovascular: RRR, no rub or murmurs.  Respiratory: CTAB, no accessory muscle use.  Extremities: Preserved peripheral pulses, No peripheral edema.  Neuro: Moving all extremities spontaneously. CN's grossly intact. No balance or gait disturbances. DTRs: no delay in relaxation phase.    Impression/ Plan    Euthyroid clinically; OK to stay on chlorthalidone even with hypercalcemia. Will get labs today; call for changes.   Gil Zurita MD

## 2025-05-31 LAB
ALBUMIN SERPL-MCNC: 4.3 G/DL (ref 3.6–5.1)
BUN SERPL-MCNC: 21 MG/DL (ref 7–25)
BUN/CREAT SERPL: 16 (CALC) (ref 6–22)
CALCIUM SERPL-MCNC: 11.2 MG/DL (ref 8.6–10.4)
CHLORIDE SERPL-SCNC: 105 MMOL/L (ref 98–110)
CO2 SERPL-SCNC: 31 MMOL/L (ref 20–32)
CREAT SERPL-MCNC: 1.28 MG/DL (ref 0.5–1.05)
EGFRCR SERPLBLD CKD-EPI 2021: 47 ML/MIN/1.73M2
EST. AVERAGE GLUCOSE BLD GHB EST-MCNC: 126 MG/DL
EST. AVERAGE GLUCOSE BLD GHB EST-SCNC: 7 MMOL/L
GLUCOSE SERPL-MCNC: 90 MG/DL (ref 65–99)
HBA1C MFR BLD: 6 %
PHOSPHATE SERPL-MCNC: 2.5 MG/DL (ref 2.5–4.5)
POTASSIUM SERPL-SCNC: 3.7 MMOL/L (ref 3.5–5.3)
PTH-INTACT SERPL-MCNC: 208 PG/ML (ref 16–77)
SODIUM SERPL-SCNC: 142 MMOL/L (ref 135–146)
T4 FREE SERPL-MCNC: 1.3 NG/DL (ref 0.8–1.8)
TSH SERPL-ACNC: 1.28 MIU/L (ref 0.4–4.5)

## 2025-06-16 PROCEDURE — RXMED WILLOW AMBULATORY MEDICATION CHARGE

## 2025-06-17 ENCOUNTER — PHARMACY VISIT (OUTPATIENT)
Dept: PHARMACY | Facility: CLINIC | Age: 65
End: 2025-06-17
Payer: COMMERCIAL

## 2025-07-24 ENCOUNTER — PATIENT MESSAGE (OUTPATIENT)
Dept: CARE COORDINATION | Facility: CLINIC | Age: 65
End: 2025-07-24
Payer: COMMERCIAL

## 2025-08-20 ENCOUNTER — PHARMACY VISIT (OUTPATIENT)
Dept: PHARMACY | Facility: CLINIC | Age: 65
End: 2025-08-20
Payer: COMMERCIAL

## 2025-08-20 PROCEDURE — RXMED WILLOW AMBULATORY MEDICATION CHARGE
